# Patient Record
Sex: FEMALE | Race: OTHER | HISPANIC OR LATINO | URBAN - METROPOLITAN AREA
[De-identification: names, ages, dates, MRNs, and addresses within clinical notes are randomized per-mention and may not be internally consistent; named-entity substitution may affect disease eponyms.]

---

## 2023-08-26 ENCOUNTER — EMERGENCY (EMERGENCY)
Facility: HOSPITAL | Age: 48
LOS: 1 days | Discharge: HOME | End: 2023-08-26
Attending: EMERGENCY MEDICINE
Payer: COMMERCIAL

## 2023-08-26 VITALS
HEART RATE: 94 BPM | WEIGHT: 201.06 LBS | DIASTOLIC BLOOD PRESSURE: 94 MMHG | OXYGEN SATURATION: 95 % | SYSTOLIC BLOOD PRESSURE: 162 MMHG | HEIGHT: 66 IN | RESPIRATION RATE: 21 BRPM | TEMPERATURE: 99 F

## 2023-08-26 DIAGNOSIS — Z98.51 TUBAL LIGATION STATUS: Chronic | ICD-10-CM

## 2023-08-26 LAB
RAPID RVP RESULT: DETECTED
RV+EV RNA SPEC QL NAA+PROBE: DETECTED
SARS-COV-2 RNA SPEC QL NAA+PROBE: SIGNIFICANT CHANGE UP

## 2023-08-26 PROCEDURE — 99283 EMERGENCY DEPT VISIT LOW MDM: CPT | Mod: 25

## 2023-08-26 PROCEDURE — 71046 X-RAY EXAM CHEST 2 VIEWS: CPT | Mod: 26

## 2023-08-26 PROCEDURE — 71046 X-RAY EXAM CHEST 2 VIEWS: CPT

## 2023-08-26 PROCEDURE — 93005 ELECTROCARDIOGRAM TRACING: CPT

## 2023-08-26 PROCEDURE — 99284 EMERGENCY DEPT VISIT MOD MDM: CPT

## 2023-08-26 PROCEDURE — 93010 ELECTROCARDIOGRAM REPORT: CPT

## 2023-08-26 PROCEDURE — 0225U NFCT DS DNA&RNA 21 SARSCOV2: CPT

## 2023-08-26 RX ORDER — LEVOCETIRIZINE DIHYDROCHLORIDE 0.5 MG/ML
1 SOLUTION ORAL
Qty: 30 | Refills: 0
Start: 2023-08-26 | End: 2023-09-24

## 2023-08-26 RX ORDER — IBUPROFEN 200 MG
1 TABLET ORAL
Qty: 28 | Refills: 0
Start: 2023-08-26 | End: 2023-09-01

## 2023-08-26 RX ORDER — DEXTROMETHORPHAN HYDROBROMIDE AND PROMETHAZINE HYDROCHLORIDE 15; 6.25 MG/5ML; MG/5ML
5 SYRUP ORAL
Qty: 100 | Refills: 0
Start: 2023-08-26 | End: 2023-08-30

## 2023-08-26 RX ORDER — IPRATROPIUM BROMIDE 21 MCG
2 AEROSOL, SPRAY (ML) NASAL
Qty: 1 | Refills: 0
Start: 2023-08-26 | End: 2023-08-29

## 2023-08-26 NOTE — ED PROVIDER NOTE - PATIENT PORTAL LINK FT
You can access the FollowMyHealth Patient Portal offered by [facility] by registering at the following website: [email or web address]. By joining [employer]’s FollowMyHealth portal, you will also be able to view your health information using other applications (apps) compatible with our system.

## 2023-08-26 NOTE — ED PROVIDER NOTE - CARDIAC, MLM
Normal rate, regular rhythm.  Heart sounds S1, S2.  No murmurs, rubs or gallops. Left sided chest pain reproducible on exam.

## 2023-08-26 NOTE — ED PROVIDER NOTE - NS ED ATTENDING STATEMENT MOD
This was a shared visit with the EDITA. I reviewed and verified the documentation and independently performed the documented:

## 2023-08-26 NOTE — ED PROVIDER NOTE - NSFOLLOWUPINSTRUCTIONS_ED_ALL_ED_FT
Los resultados de vazquez prueba de hisopado nasal estarán disponibles en el portal del paciente dentro de las próximas 24 horas. Puede acceder al portal para pacientes FollowMyHealth ofrecido por [facility] registrándose en el siguiente sitio web: [email or web address]. Si no puede acceder a los resultados, puede llamar al departamento de emergencias al [phone] después de 24 horas para obtener rex resultados.    Si da positivo por covid, debe hacer cuarentena janis 5 días desde el inicio de los síntomas o desde la fecha del hisopado si es asintomático.    [place] motrin y/o tylenol según sea necesario para la fiebre o el dolor    Medicamentos enviados a la farmacia por usted. Kathrine según las indicaciones. A veces, el medicamento para la tos no está cubierto por el seguro, si socrates es el maurisio, puede pagarlo de vazquez bolsillo o kathrine medicamentos de venta curtis para el resfriado sumeet DayQuil o NyQuil severo para tratarlo.    Para el dolor de garganta puede utilizar Pastillas de Cepacol.    Si experimenta síntomas nuevos o que empeoran, sumeet dolor en el pecho o dificultad para respirar, o si está preocupado, siempre puede regresar a la emergencia para tuan reevaluación. Los resultados de vazquez prueba de hisopado nasal estarán disponibles en el portal del paciente dentro de las próximas 24 horas. Puede acceder al portal para pacientes [facility] ofrecido por [facility] registrándose en el siguiente sitio web: [email or web address]. Si no puede acceder a los resultados, puede llamar al departamento de emergencias al [phone] después de 24 horas para obtener rex resultados.    Si da positivo por covid, debe hacer cuarentena janis 5 días desde el inicio de los síntomas o desde la fecha del hisopado si es asintomático.    [place] motrin y/o tylenol según sea necesario para la fiebre o el dolor    Medicamentos enviados a la farmacia por usted. Kathrine según las indicaciones. A veces, el medicamento para la tos no está cubierto por el seguro, si socrates es el maurisio, puede pagarlo de vazquez bolsillo o kathrine medicamentos de venta curtis para el resfriado sumeet DayQuil o NyQuil severo para tratarlo.    Para el dolor de garganta puede utilizar Pastillas de Cepacol.    Si experimenta síntomas nuevos o que empeoran, sumeet dolor en el pecho o dificultad para respirar, o si está preocupado, siempre puede regresar a la emergencia para tuan reevaluación.    Your nasal swab test results will be available on the patient portal within the next 24 hours. You can access the [facility] Patient portal offered by [facility] by registering at the following website: [email or web address]. If you are unable to access the results, you can call the emergency department at [phone] after 24 hours to get your results.    If you test positive for covid you must quarantine for 5 days from start of symptoms or from swab date if asymptomatic.     Take motrin and/or tylenol as needed for fever or pain     Medications sent to the pharmacy for you. Take as directed. Sometimes the cough medicine is not covered by insurance, if that is the case you can either pay out of pocket for it or take over the counter cold medications such as DayQuil or NyQuil severe to treat it.    For sore throat you can use Cepacol Lozenges.     If you experience any new or worsening symptoms such as chest pain or difficulty breathing or if you are concerned you can always come back to the emergency for a re-evaluation.

## 2023-08-26 NOTE — ED PROVIDER NOTE - ATTENDING APP SHARED VISIT CONTRIBUTION OF CARE
48 female with hx of no known medical problems.   Pt presenting to the ED reporting subjective fever/chills, congestion, runny nose, cough, & chest pain since Wednesday.  No recent travel    Constitutional: (+) fever, (-) chills  HENT: (+) congestion, (+) rhinorrhea, (-) sore throat  Eyes: (-) pain, (-) redness  Respiratory: (+) cough, (+) shortness of breath, (-) wheezing, (-) stridor  Cardiovascular: (+) chest pain, (-) palpitations, (-) leg swelling.  Gastrointestinal: (-) abdominal pain, (-) blood in stool (no melena/hematochezia), (-) diarrhea, (-) vomiting  Genitourinary: (-) dysuria, (-) hematuria  Musculoskeletal: (-) gait problem, (-) joint swelling, (+) myalgias  Skin: (-) color change, (-) rash  Neurological: (-) weakness, (-) numbness, (-) headaches  Psychiatric/Behavioral: (-) confusion    Gen:  Awake, alert, NAD, WDWN, NCAT, non-toxic appearing.   Eyes:  PERRL, EOMI, no icterus, normal lids/lashes, normal conjunctivae.  ENT:  External inspection normal, pink/moist membranes.  Pharynx normal, no pharyngeal erythema/exudate, no drooling, no lip/tongue/palate/posterior pharynx edema, midline uvula, no oropharyngeal ulcerations/lesions.  TM's & canals normal b/l.  CV:  S1S2, regular rate and rhythm, no murmur/gallops/rubs, no JVD, 2+ pulses b/l, no edema/cords/homans, warm/well-perfused.  Resp:  Normal respiratory rate/effort, no respiratory distress, normal voice, speaking full sentences, lungs clear to auscultation b/l, no wheezing/rales/rhonchi, no retractions, no stridor.  Abd:  Soft abdomen, no tender/distended/guarding/rebound, no pulsatile mass, no CVA tender.  Musculoskeletal:  N/V intact, FROM all 4 extremities, normal motor tone, stable gait.   Neck:  FROM neck, supple, trachea midline, no meningismus.   Skin:  Color normal for race, warm and dry, no rash.  Neuro:  Oriented x3, CN 2-12 intact (grossly), normal motor (grossly), normal sensory (grossly), normal gait.  GCS 15  Psych:  Attention normal. Affect normal. Behavior normal. Judgment normal.    48 female with no known medical problems presenting to the ED with URI symptoms & cough with reproducible chest pain.    Vitals stable.    Nontoxic appearing, n/v intact.  Airway intact, no respiratory distress, no hypoxia.    Plan to obtain:    -COVID testing, CXR R/O pneumonia, analgesia as needed, antipyretics as needed, likely discharge with outpatient follow-up.    COVID testing at time of DC  Independent interpretation of XR: No consolidation/effusion/pntx     Patient stable for further care in outpatient setting. No indication for inpatient admission at this time. Patient advised regarding symptomatic & supportive care and symptoms to prompt ED return. Strict return precautions given. 48 female with hx of no known medical problems.   Pt presenting to the ED reporting subjective fever/chills, congestion, runny nose, cough, & chest pain since Wednesday.  No recent travel    Constitutional: (+) fever, (-) chills  HENT: (+) congestion, (+) rhinorrhea, (-) sore throat  Eyes: (-) pain, (-) redness  Respiratory: (+) cough, (+) shortness of breath, (-) wheezing, (-) stridor  Cardiovascular: (+) chest pain, (-) palpitations, (-) leg swelling.  Gastrointestinal: (-) abdominal pain, (-) blood in stool (no melena/hematochezia), (-) diarrhea, (-) vomiting  Genitourinary: (-) dysuria, (-) hematuria  Musculoskeletal: (-) gait problem, (-) joint swelling, (+) myalgias  Skin: (-) color change, (-) rash  Neurological: (-) weakness, (-) numbness, (-) headaches  Psychiatric/Behavioral: (-) confusion    Gen:  Awake, alert, NAD, WDWN, NCAT, non-toxic appearing.   Eyes:  PERRL, EOMI, no icterus, normal lids/lashes, normal conjunctivae.  ENT:  External inspection normal, pink/moist membranes.  Pharynx normal, no pharyngeal erythema/exudate, no drooling, no lip/tongue/palate/posterior pharynx edema, midline uvula, no oropharyngeal ulcerations/lesions.  TM's & canals normal b/l.  CV:  S1S2, regular rate and rhythm, no murmur/gallops/rubs, no JVD, 2+ pulses b/l, no edema/cords/homans, warm/well-perfused.  Resp:  Normal respiratory rate/effort, no respiratory distress, normal voice, speaking full sentences, lungs clear to auscultation b/l, no wheezing/rales/rhonchi, no retractions, no stridor.  Abd:  Soft abdomen, no tender/distended/guarding/rebound, no pulsatile mass, no CVA tender.  Musculoskeletal:  N/V intact, FROM all 4 extremities, normal motor tone, stable gait.   Neck:  FROM neck, supple, trachea midline, no meningismus.   Skin:  Color normal for race, warm and dry, no rash.  Neuro:  Oriented x3, CN 2-12 intact (grossly), normal motor (grossly), normal sensory (grossly), normal gait.  GCS 15  Psych:  Attention normal. Affect normal. Behavior normal. Judgment normal.    48 female with no known medical problems presenting to the ED with URI symptoms & cough with reproducible chest pain. Symptoms concerning for viral illness.    Vitals stable.    Nontoxic appearing, n/v intact.  Airway intact, no respiratory distress, no hypoxia.    Plan to obtain:    -COVID testing, CXR R/O pneumonia, analgesia as needed, antipyretics as needed, likely discharge with outpatient follow-up.    COVID testing at time of DC  Independent interpretation of XR: No consolidation/effusion/pntx   Independent interpretation of EKG: Sinus at 96, normal axis, normal interval, normal ST/T    Patient stable for further care in outpatient setting. No indication for inpatient admission at this time. Patient advised regarding symptomatic & supportive care and symptoms to prompt ED return. Strict return precautions given. I agree with the NP findings except as noted in my attestation note.     48 female with hx of no known medical problems.   Pt presenting to the ED reporting subjective fever/chills, congestion, runny nose, cough, & chest pain since Wednesday.  No recent travel    Constitutional: (+) fever, (-) chills  HENT: (+) congestion, (+) rhinorrhea, (-) sore throat  Eyes: (-) pain, (-) redness  Respiratory: (+) cough, (+) shortness of breath, (-) wheezing, (-) stridor  Cardiovascular: (+) chest pain, (-) palpitations, (-) leg swelling.  Gastrointestinal: (-) abdominal pain, (-) blood in stool (no melena/hematochezia), (-) diarrhea, (-) vomiting  Genitourinary: (-) dysuria, (-) hematuria  Musculoskeletal: (-) gait problem, (-) joint swelling, (+) myalgias  Skin: (-) color change, (-) rash  Neurological: (-) weakness, (-) numbness, (-) headaches  Psychiatric/Behavioral: (-) confusion    Gen:  Awake, alert, NAD, WDWN, NCAT, non-toxic appearing.   Eyes:  PERRL, EOMI, no icterus, normal lids/lashes, normal conjunctivae.  ENT:  External inspection normal, pink/moist membranes.  Pharynx normal, no pharyngeal erythema/exudate, no drooling, no lip/tongue/palate/posterior pharynx edema, midline uvula, no oropharyngeal ulcerations/lesions.  TM's & canals normal b/l.  CV:  S1S2, regular rate and rhythm, no murmur/gallops/rubs, no JVD, 2+ pulses b/l, no edema/cords/homans, warm/well-perfused.  Resp:  Normal respiratory rate/effort, no respiratory distress, normal voice, speaking full sentences, lungs clear to auscultation b/l, no wheezing/rales/rhonchi, no retractions, no stridor.  Abd:  Soft abdomen, no tender/distended/guarding/rebound, no pulsatile mass, no CVA tender.  Musculoskeletal:  N/V intact, FROM all 4 extremities, normal motor tone, stable gait.   Neck:  FROM neck, supple, trachea midline, no meningismus.   Skin:  Color normal for race, warm and dry, no rash.  Neuro:  Oriented x3, CN 2-12 intact (grossly), normal motor (grossly), normal sensory (grossly), normal gait.  GCS 15  Psych:  Attention normal. Affect normal. Behavior normal. Judgment normal.    48 female with no known medical problems presenting to the ED with URI symptoms & cough with reproducible chest pain. Symptoms concerning for viral illness.    Vitals stable.    Nontoxic appearing, n/v intact.  Airway intact, no respiratory distress, no hypoxia.    Plan to obtain:    -COVID testing, CXR R/O pneumonia, analgesia as needed, antipyretics as needed, likely discharge with outpatient follow-up.    COVID testing at time of DC  Independent interpretation of XR: No consolidation/effusion/pntx   Independent interpretation of EKG: Sinus at 96, normal axis, normal interval, normal ST/T    Patient stable for further care in outpatient setting. No indication for inpatient admission at this time. Patient advised regarding symptomatic & supportive care and symptoms to prompt ED return. Strict return precautions given.

## 2023-08-26 NOTE — ED PROVIDER NOTE - OBJECTIVE STATEMENT
#[id number]: 28-year-old female with a history of a tubal ligation presents with cough, fever, nasal congestion, chest pain that began on Wednesday.  Patient reports she took Tylenol for her symptoms. Denies ear pain, sore throat, abd pain, n/v/d. +covid sick contacts.  #[id number]: 48-year-old female with a history of a tubal ligation presents with cough, fever, nasal congestion, chest pain that began on Wednesday.  Patient reports she took Tylenol for her symptoms. Denies ear pain, sore throat, abd pain, n/v/d. +covid sick contacts.

## 2023-08-26 NOTE — ED PROVIDER NOTE - CLINICAL SUMMARY MEDICAL DECISION MAKING FREE TEXT BOX
#[id number]: 28-year-old female with a history of a tubal ligation presents with cough, fever, nasal congestion, chest pain that began on Wednesday.  Patient reports she took Tylenol for her symptoms. Denies ear pain, sore throat, abd pain, n/v/d. +covid sick contacts.    On exam, left sided chest pain reproducible on exam, consistent with costochondritis. Lung sounds clear - harsh cough noted. Well appearing on exam. Likely viral illness, possibly covid or the Flu. Will test for covid/flu, obtain CXR to r/o PNA and dc home with supportive care.

## 2023-08-26 NOTE — ED PROVIDER NOTE - PROGRESS NOTE DETAILS
48 female with hx of no known medical problems.   Pt presenting to the ED reporting subjective fever/chills, congestion, runny nose, cough, & chest pain since Wednesday.  No recent travel    Constitutional: (+) fever, (-) chills  HENT: (+) congestion, (+) rhinorrhea, (-) sore throat  Eyes: (-) pain, (-) redness  Respiratory: (+) cough, (+) shortness of breath, (-) wheezing, (-) stridor  Cardiovascular: (+) chest pain, (-) palpitations, (-) leg swelling.  Gastrointestinal: (-) abdominal pain, (-) blood in stool (no melena/hematochezia), (-) diarrhea, (-) vomiting  Genitourinary: (-) dysuria, (-) hematuria  Musculoskeletal: (-) gait problem, (-) joint swelling, (+) myalgias  Skin: (-) color change, (-) rash  Neurological: (-) weakness, (-) numbness, (-) headaches  Psychiatric/Behavioral: (-) confusion    Gen:  Awake, alert, NAD, WDWN, NCAT, non-toxic appearing.   Eyes:  PERRL, EOMI, no icterus, normal lids/lashes, normal conjunctivae.  ENT:  External inspection normal, pink/moist membranes.  Pharynx normal, no pharyngeal erythema/exudate, no drooling, no lip/tongue/palate/posterior pharynx edema, midline uvula, no oropharyngeal ulcerations/lesions.  TM's & canals normal b/l.  CV:  S1S2, regular rate and rhythm, no murmur/gallops/rubs, no JVD, 2+ pulses b/l, no edema/cords/homans, warm/well-perfused.  Resp:  Normal respiratory rate/effort, no respiratory distress, normal voice, speaking full sentences, lungs clear to auscultation b/l, no wheezing/rales/rhonchi, no retractions, no stridor.  Abd:  Soft abdomen, no tender/distended/guarding/rebound, no pulsatile mass, no CVA tender.  Musculoskeletal:  N/V intact, FROM all 4 extremities, normal motor tone, stable gait.   Neck:  FROM neck, supple, trachea midline, no meningismus.   Skin:  Color normal for race, warm and dry, no rash.  Neuro:  Oriented x3, CN 2-12 intact (grossly), normal motor (grossly), normal sensory (grossly), normal gait.  GCS 15  Psych:  Attention normal. Affect normal. Behavior normal. Judgment normal.    48 female with no known medical problems presenting to the ED with URI symptoms & cough with reproducible chest pain.    Vitals stable.    Nontoxic appearing, n/v intact.  Airway intact, no respiratory distress, no hypoxia.    Plan to obtain:    -COVID testing, CXR R/O pneumonia, analgesia as needed, antipyretics as needed, likely discharge with outpatient follow-up.